# Patient Record
Sex: FEMALE | Race: WHITE | NOT HISPANIC OR LATINO | Employment: FULL TIME | URBAN - METROPOLITAN AREA
[De-identification: names, ages, dates, MRNs, and addresses within clinical notes are randomized per-mention and may not be internally consistent; named-entity substitution may affect disease eponyms.]

---

## 2021-03-26 DIAGNOSIS — Z23 ENCOUNTER FOR IMMUNIZATION: ICD-10-CM

## 2025-07-23 ENCOUNTER — OFFICE VISIT (OUTPATIENT)
Age: 50
End: 2025-07-23
Payer: COMMERCIAL

## 2025-07-23 DIAGNOSIS — S83.242D OTHER TEAR OF MEDIAL MENISCUS OF LEFT KNEE AS CURRENT INJURY, SUBSEQUENT ENCOUNTER: ICD-10-CM

## 2025-07-23 DIAGNOSIS — M17.12 PRIMARY OSTEOARTHRITIS OF LEFT KNEE: Primary | ICD-10-CM

## 2025-07-23 PROCEDURE — 99213 OFFICE O/P EST LOW 20 MIN: CPT | Performed by: FAMILY MEDICINE

## 2025-07-23 NOTE — PROGRESS NOTES
Orthopaedic Surgery - Office Note  Krissy Arriola (49 y.o. female)   : 1975   MRN: [id number]  Encounter Date: 2025    Chief Complaint   Patient presents with    Left Knee - Follow-up     Mri review scanning for occult calcaneal fx vs stress fx vs ruptured plantar fascia         Assessment & Plan  Primary osteoarthritis of left knee    Orders:    Ambulatory Referral to Physical Therapy; Future    Other tear of medial meniscus of left knee as current injury, subsequent encounter         Pathophysiology was reviewed, management options were discussed.    She has advanced osteoarthritis of the medial compartment of her left knee, and associated medial meniscus tear.  I will await official radiology report, and call if there is any new information.  It is time we discussed surgical versus conservative options.  We decided that conservative care would be best at this time.  Benefits and risk of corticosteroid injection were discussed, which she declined..  I did prescribe formal physical therapy for strengthening.  I also recommend she continue with straight line activities, i.e. walking, yoga, elliptical, cycling which she may benefit her condition.  Try to minimize twist and pivot type activities.  She may take an occasional Advil as needed for pain.  We also discussed food supplements like turmeric curcumin and glucosamine chondroitin which can be helpful for her condition.  Anticipate improvement with improved biomechanics and strength.  Return for any worsening concerns.    We also did discuss viscosupplementation which can be an option in the future.  She will consider this.      all patient concerns were addressed today, all relevant questions were answered.        No follow-ups on file.        History of Present Illness      Review of Systems  Pertinent items are noted in HPI.  All other systems were reviewed and are negative.    Physical Exam  There were no vitals taken for this visit.  Cons: Appears  well.  No apparent distress.  Psych: Alert. Oriented x3.  Mood and affect normal.      Focused exam of the left knee demonstrates some very mild medial joint line tenderness.  There is no meniscal signs with normal Barb Apley and bounce home.  There is a normal Lachman and valgus.  There is no effusion.  Patellofemoral exam is normal.  She has motion that is limited from 0 to 100 degrees.  Motor is 5-5 flexion extension.        Studies Reviewed  I did interpret her MRI of the left knee which appears to have advanced medial compartment osteoarthritis and a medial meniscus tear.  There is no official radiology report, it is pending.  Procedures  No procedures today.    Medical, Surgical, Family, and Social History  The patient's medical history, family history, and social history, were reviewed and updated as appropriate.    Past Medical History[1]    Past Surgical History[2]    Family History[3]    Social History     Occupational History    Not on file   Tobacco Use    Smoking status: Never    Smokeless tobacco: Never   Substance and Sexual Activity    Alcohol use: Yes    Drug use: Not on file    Sexual activity: Not on file       Allergies[4]    Current Medications[5]      Gerald Solomon MD       [1]   Past Medical History:  Diagnosis Date    Carpal tunnel syndrome    [2]   Past Surgical History:  Procedure Laterality Date    CARPAL TUNNEL RELEASE N/A      SECTION      WISDOM TOOTH EXTRACTION N/A    [3] No family history on file.  [4] No Known Allergies  [5]   Current Outpatient Medications:     esomeprazole (NexIUM) 20 mg capsule, Take 20 mg by mouth every morning before breakfast, Disp: , Rfl: